# Patient Record
Sex: FEMALE | Race: WHITE | Employment: OTHER | URBAN - METROPOLITAN AREA
[De-identification: names, ages, dates, MRNs, and addresses within clinical notes are randomized per-mention and may not be internally consistent; named-entity substitution may affect disease eponyms.]

---

## 2019-05-30 ENCOUNTER — APPOINTMENT (OUTPATIENT)
Dept: CT IMAGING | Age: 69
End: 2019-05-30
Payer: MEDICARE

## 2019-05-30 ENCOUNTER — HOSPITAL ENCOUNTER (EMERGENCY)
Age: 69
Discharge: ANOTHER HOSPITAL | End: 2019-05-30
Attending: EMERGENCY MEDICINE
Payer: MEDICARE

## 2019-05-30 VITALS
SYSTOLIC BLOOD PRESSURE: 124 MMHG | HEART RATE: 79 BPM | HEIGHT: 64 IN | OXYGEN SATURATION: 100 % | BODY MASS INDEX: 16.22 KG/M2 | WEIGHT: 95 LBS | RESPIRATION RATE: 18 BRPM | DIASTOLIC BLOOD PRESSURE: 74 MMHG | TEMPERATURE: 98.1 F

## 2019-05-30 DIAGNOSIS — S02.30XA CLOSED FRACTURE OF ZYGOMATICOMAXILLARY COMPLEX, INITIAL ENCOUNTER (HCC): Primary | ICD-10-CM

## 2019-05-30 DIAGNOSIS — S02.80XA CLOSED FRACTURE OF ZYGOMATICOMAXILLARY COMPLEX, INITIAL ENCOUNTER (HCC): Primary | ICD-10-CM

## 2019-05-30 DIAGNOSIS — S02.401A CLOSED FRACTURE OF ZYGOMATICOMAXILLARY COMPLEX, INITIAL ENCOUNTER (HCC): Primary | ICD-10-CM

## 2019-05-30 DIAGNOSIS — S02.402A CLOSED FRACTURE OF ZYGOMATICOMAXILLARY COMPLEX, INITIAL ENCOUNTER (HCC): Primary | ICD-10-CM

## 2019-05-30 PROCEDURE — 6370000000 HC RX 637 (ALT 250 FOR IP): Performed by: EMERGENCY MEDICINE

## 2019-05-30 PROCEDURE — 99284 EMERGENCY DEPT VISIT MOD MDM: CPT

## 2019-05-30 PROCEDURE — 70486 CT MAXILLOFACIAL W/O DYE: CPT

## 2019-05-30 PROCEDURE — 2500000003 HC RX 250 WO HCPCS: Performed by: EMERGENCY MEDICINE

## 2019-05-30 RX ORDER — LISINOPRIL 5 MG/1
TABLET ORAL
COMMUNITY
Start: 2019-05-29 | End: 2022-02-08 | Stop reason: SDUPTHER

## 2019-05-30 RX ORDER — ONDANSETRON 4 MG/1
4 TABLET, ORALLY DISINTEGRATING ORAL ONCE
Status: COMPLETED | OUTPATIENT
Start: 2019-05-30 | End: 2019-05-30

## 2019-05-30 RX ORDER — SIMVASTATIN 10 MG
TABLET ORAL
COMMUNITY
Start: 2019-05-29 | End: 2022-02-08 | Stop reason: SDUPTHER

## 2019-05-30 RX ORDER — LIDOCAINE HYDROCHLORIDE AND EPINEPHRINE 10; 10 MG/ML; UG/ML
20 INJECTION, SOLUTION INFILTRATION; PERINEURAL ONCE
Status: COMPLETED | OUTPATIENT
Start: 2019-05-30 | End: 2019-05-30

## 2019-05-30 RX ADMIN — ONDANSETRON 4 MG: 4 TABLET, ORALLY DISINTEGRATING ORAL at 02:10

## 2019-05-30 RX ADMIN — LIDOCAINE HYDROCHLORIDE,EPINEPHRINE BITARTRATE 20 ML: 10; .01 INJECTION, SOLUTION INFILTRATION; PERINEURAL at 02:45

## 2019-05-30 SDOH — HEALTH STABILITY: MENTAL HEALTH: HOW OFTEN DO YOU HAVE A DRINK CONTAINING ALCOHOL?: NEVER

## 2019-05-30 ASSESSMENT — PAIN SCALES - GENERAL: PAINLEVEL_OUTOF10: 2

## 2019-05-30 NOTE — ED NOTES
Pt refused to have bandage applied/pt holding a wet wash cloth on her laceration.      Chani Brooke RN  05/30/19 9414

## 2019-05-30 NOTE — ED PROVIDER NOTES
[address] [facility] RESIDENT NOTE       Date of evaluation: 5/30/2019    Chief Complaint     Facial Laceration (laceration to right upper lip/pt reports she tripped and hit her computer desk)      History of Present Illness     Marcie Vargas is a 76 y.o. female who presents to the ED after a mechanical ground level fall that occurred just prior to arrival in the ED. She tripped while walking through her bedroom and fell into the corner of her wooden desk, contacting directly with her right orbit/maxilla. She denies loss of consciousness, nausea or vomiting, head pain, neck or back pain, chest pain or dyspnea, abdominal pain, and pain in her extremities. She also suffered lacerations to the right nasolabial fold and the right buccal mucosa. Denies malocclusion. Review of Systems     Review of Systems    10-point review of systems completed and negative other than those items listed in the HPI above. Past Medical, Surgical, Family, and Social History     She has a past medical history of Hyperlipidemia and Hypertension. She has no past surgical history on file. Her family history is not on file. She reports that she has been smoking cigarettes. She has been smoking about 1.00 pack per day. She has never used smokeless tobacco. She reports that she does not drink alcohol or use drugs. Medications     Previous Medications    LISINOPRIL (PRINIVIL;ZESTRIL) 5 MG TABLET    TAKE 1 TABLET BY MOUTH EVERY DAY    SIMVASTATIN (ZOCOR) 10 MG TABLET    TAKE 1 TABLET BY MOUTH EVERY DAY IN THE EVENING       Allergies     She has No Known Allergies. Physical Exam     INITIAL VITALS: BP: (!) 121/109, Temp: 98.1 °F (36.7 °C), Pulse: 78, Resp: 18, SpO2: 100 %   Physical Exam    General: Well developed and well nourished. No acute distress. HEENT: Right periorbital and maxillary ecchymosis with tenderness to palpation all along the zygomatic arch and maxilla.   No midface instability. Dental appliances in place with no fractures or malocclusion. No signs of entrapment. 3 cm partial thickness laceration along the right nasolabial fold, the inferior portion of which crosses the vermilion border. There is an additional small laceration to the right buccal mucosa that does not communicate with the external laceration. PERRL, moist mucous membranes. Neck: Trachea midline, neck supple with FROM and no midline tenderness, step-offs, or deformities; no JVD, no LAD  Heart: Regular rate and rhythm. No murmurs, gallops, or rubs appreciated. Lungs: Clear to auscultation in all fields bilaterally. Normal effort. Abd: Nondistended, no signs of trauma. Bowel sounds present. No tenderness to palpation, guarding, or rebound. MSK: No obvious deformities. Range of motion grossly intact. No thoracic or lumbar spine tenderness or deformity. No chest wall tenderness or deformity. Pelvis is stable to AP compression. No pain with bilateral log roll. All 4 extremities range through full spectrum of motion with no pain. Extremities: No cyanosis or edema. Peripheral pulses intact. Skin: No rashes, abrasions, contusions, or lacerations noted (other than the facial laceration mentioned above). Neuro: Alert and oriented, moves all extremities spontaneously. No gross motor or sensory deficits. Psych: Mood and affect appropriate. Thought process and content normal.    DiagnosticResults     EKG   Interpreted in conjunction with emergencydepartment physician Lionel Taylor MD    None    RADIOLOGY:  CT FACIAL BONES WO CONTRAST   Final Result     Acute right zygomaticomaxillary complex fracture is seen with fractures    of the anterior and posterior walls of the right maxillary sinus. There    is significant bony displacement of the inferior aspect of the maxillary    sinus fractures. LABS:   No results found for this visit on 05/30/19.     ED BEDSIDE ULTRASOUND:  None    RECENT VITALS: BP: (!) 121/109, Temp: 98.1 °F (36.7 °C), Pulse: 78,Resp: 18, SpO2: 100 %     Procedures     None    ED Course     Nursing Notes, Past Medical Hx, Past Surgical Hx, Social Hx, Allergies, and Family Hx were reviewed. The patient was given the followingmedications:  Orders Placed This Encounter   Medications    ondansetron (ZOFRAN-ODT) disintegrating tablet 4 mg    lidocaine-EPINEPHrine 1 percent-[id number] injection 20 mL       CONSULTS:  None    MEDICAL DECISION MAKING / ASSESSMENT / Christina Sharpe is a 76 y.o. female who presented to the emergency department with facial pain and laceration after a mechanical ground-level fall. On presentation she was hemodynamically stable and in no acute distress. GCS was 15, primary survey was intact, secondary survey was notable for laceration to the right nasolabial fold as well as to the right buccal mucosa. These lacerations were unrelated and noncommunicating. She also had right periorbital ecchymosis and tenderness along her zygomatic arch and maxilla. There were no clinical signs of entrapment. Remainder of her trauma physical exam showed no other concerning findings. Further CT imaging of head or cervical spine was not indicated. She declined my offer of pain or nausea medications. She stated that her tetanus booster was up to date. Prior to laceration repair I obtained a maxillofacial CT which showed right zygomaticomaxillary complex fracture with significant displacement of the inferior aspect of the maxillary sinus. I spoke with Dr. Leana Cleary, who was the attending on call for plastic surgery covering for face and he agreed to have the plastic surgery team evaluate the patient in the ED at [facility] but also recommended that ophthalmology be consulted for additional recommendations. Laceration repair was thus deferred for evaluation by plastic surgery and wound covered with dressing.     I discussed our findings and plan with the patient and she agreed to proceed by private vehicle to [facility] for further evaluation by plastic surgery. She was transferred to the receiving facility by POV in stable condition. This patient was also evaluated by the attending physician. All care plans werediscussed and agreed upon. Clinical Impression     1.  Closed fracture of zygomaticomaxillary complex, initial encounter [facility])        Disposition     PATIENT REFERRED TO:  [address] [place] [address]  [phone]      proceed directly to [facility] ED to be seen by plastic surgery      DISCHARGE MEDICATIONS:  New Prescriptions    No medications on file       DISPOSITION  Transferred to MD Shadi  05/30/19 0426

## 2019-05-30 NOTE — ED NOTES
Pt transferred to [facility] ER. Pt taken in POV by family at bedside. Pt and family verbalized understanding that they were going to [facility].       Aniceto Hernandez RN  05/30/19 6218

## 2022-02-08 ENCOUNTER — HOSPITAL ENCOUNTER (EMERGENCY)
Age: 72
Discharge: HOME | End: 2022-02-08
Attending: EMERGENCY MEDICINE
Payer: MEDICARE

## 2022-02-08 ENCOUNTER — APPOINTMENT (OUTPATIENT)
Dept: CT IMAGING | Age: 72
End: 2022-02-08
Payer: MEDICARE

## 2022-02-08 VITALS
HEART RATE: 88 BPM | RESPIRATION RATE: 18 BRPM | TEMPERATURE: 98.5 F | WEIGHT: 100 LBS | OXYGEN SATURATION: 95 % | SYSTOLIC BLOOD PRESSURE: 148 MMHG | BODY MASS INDEX: 17.07 KG/M2 | HEIGHT: 64 IN | DIASTOLIC BLOOD PRESSURE: 96 MMHG
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
## 2022-02-08 DIAGNOSIS — S50.12XA CONTUSION OF LEFT ELBOW AND FOREARM, INITIAL ENCOUNTER: ICD-10-CM

## 2022-02-08 DIAGNOSIS — S00.83XA FACIAL CONTUSION, INITIAL ENCOUNTER: Primary | ICD-10-CM

## 2022-02-08 PROCEDURE — 70450 CT HEAD/BRAIN W/O DYE: CPT

## 2022-02-08 PROCEDURE — 2500000003 HC RX 250 WO HCPCS: Performed by: PHYSICIAN ASSISTANT

## 2022-02-08 PROCEDURE — 99284 EMERGENCY DEPT VISIT MOD MDM: CPT

## 2022-02-08 RX ORDER — LIDOCAINE/RACEPINEP/TETRACAINE 4-0.05-0.5
SOLUTION WITH PREFILLED APPLICATOR (ML) TOPICAL ONCE
Status: COMPLETED | OUTPATIENT
Start: 2022-02-08 | End: 2022-02-08

## 2022-02-08 RX ORDER — SIMVASTATIN 10 MG
TABLET ORAL
Qty: 30 TABLET | Refills: 0 | Status: SHIPPED | OUTPATIENT
Start: 2022-02-08

## 2022-02-08 RX ORDER — LISINOPRIL 5 MG/1
TABLET ORAL
Qty: 30 TABLET | Refills: 0 | Status: SHIPPED | OUTPATIENT
Start: 2022-02-08

## 2022-02-08 RX ORDER — AMOXICILLIN AND CLAVULANATE POTASSIUM 875; 125 MG/1; MG/1
1 TABLET, FILM COATED ORAL 2 TIMES DAILY
Qty: 20 TABLET | Refills: 0 | Status: SHIPPED | OUTPATIENT
Start: 2022-02-08 | End: 2022-02-08

## 2022-02-08 RX ADMIN — LIDOCAINE-EPINEPHRINE-TETRACAINE EXTERNAL SOLN 4-0.05-0.5% 3 ML: 4-0.05-0.5 SOLUTION at 23:16

## 2022-02-08 ASSESSMENT — PAIN DESCRIPTION - DESCRIPTORS: DESCRIPTORS: SORE

## 2022-02-08 ASSESSMENT — PAIN DESCRIPTION - ORIENTATION: ORIENTATION: LEFT

## 2022-02-08 ASSESSMENT — PAIN DESCRIPTION - FREQUENCY: FREQUENCY: CONTINUOUS

## 2022-02-08 ASSESSMENT — PAIN DESCRIPTION - LOCATION: LOCATION: FACE

## 2022-02-08 ASSESSMENT — PAIN DESCRIPTION - PAIN TYPE: TYPE: ACUTE PAIN

## 2022-02-08 ASSESSMENT — PAIN SCALES - GENERAL: PAINLEVEL_OUTOF10: 4

## 2022-02-09 ASSESSMENT — ENCOUNTER SYMPTOMS
WHEEZING: 0
CHEST TIGHTNESS: 0
ABDOMINAL PAIN: 0
EYE REDNESS: 0
SORE THROAT: 0
BACK PAIN: 0
COLOR CHANGE: 0
DIARRHEA: 0
ABDOMINAL DISTENTION: 0
EYE PAIN: 0
VOMITING: 0
SINUS PRESSURE: 0
SHORTNESS OF BREATH: 0
RHINORRHEA: 0
NAUSEA: 0
COUGH: 0
EYE ITCHING: 0

## 2022-02-09 NOTE — ED PROVIDER NOTES
[facility]  EMERGENCY DEPARTMENT ENCOUNTER          PHYSICIAN ASSISTANT NOTE       Date of evaluation: 2/8/2022    Chief Complaint     Fall      History of Present Illness     Ashleigh Mosqueda is a 70 y.o. female with a past medical history as noted below who presents to the Emergency Department with a complaint of injuries from a fall. The patient reports that she had walked up to her driveway and was between the 2 cars in her driveway, when she slipped on ice, sustaining a fall, landing on the left side of her face and body. She denies any loss of consciousness. She reports that she landed on her left elbow and struck the left side of her face against the ground. She notes that she has a bruise to the left elbow, but reports no bony tenderness, joint dysfunction or significant pain in the region. She also notes that she has an increasing and worsening bruise over her left cheek. The patient notes that she bruises easily, though she is not on any anticoagulant or antiplatelet therapy. No lightheadedness, dizziness, chest pain, shortness of breath or other concerning prodromal symptoms prior to the fall. No nausea or vomiting, visual acuity changes, amnesia to the event or altered mentation since the time of the fall. Her tetanus immunization is reported to be up-to-date. Review of Systems     Review of Systems   Constitutional: Negative for chills, diaphoresis, fever and unexpected weight change. HENT: Negative for congestion, drooling, mouth sores, postnasal drip, rhinorrhea, sinus pressure and sore throat. Eyes: Negative for pain, redness and itching. Respiratory: Negative for cough, chest tightness, shortness of breath and wheezing. Cardiovascular: Negative for chest pain, palpitations and leg swelling. Gastrointestinal: Negative for abdominal distention, abdominal pain, diarrhea, nausea and vomiting. Genitourinary: Negative for dysuria and hematuria.    Musculoskeletal: Negative for arthralgias, back pain, gait problem, myalgias, neck pain and neck stiffness. Skin: Positive for wound. Negative for color change, pallor and rash. Neurological: Negative for dizziness, seizures, syncope, weakness, light-headedness, numbness and headaches. Hematological: Does not bruise/bleed easily. Psychiatric/Behavioral: Negative for agitation, hallucinations, self-injury, sleep disturbance and suicidal ideas. The patient is not nervous/anxious. All other systems reviewed and are negative. Physical Exam     INITIAL VITALS: BP: (!) 178/111, Temp: 98.5 °F (36.9 °C), Pulse: 85, Resp: 18, SpO2: 95 %     Nursing note and vitals reviewed. Physical Exam  Vitals and nursing note reviewed. Constitutional:       General: She is not in acute distress. Appearance: She is well-developed. She is not diaphoretic. HENT:      Head: Normocephalic. Abrasion and contusion present. Jaw: No tenderness or pain on movement. Comments: Large contusion with ecchymosis and abrasion overlying the patient's left zygoma. No tenderness to palpation of the orbital rim or surrounding facial bones. Right Ear: Tympanic membrane and ear canal normal.      Left Ear: Tympanic membrane and ear canal normal.      Nose:      Right Nostril: No epistaxis. Left Nostril: No epistaxis. Mouth/Throat:      Mouth: No injury. Eyes:      General: No visual field deficit or scleral icterus. Extraocular Movements: Extraocular movements intact. Conjunctiva/sclera: Conjunctivae normal.      Pupils: Pupils are equal, round, and reactive to light. Neck:      Vascular: No JVD. Cardiovascular:      Rate and Rhythm: Normal rate and regular rhythm. Heart sounds: Normal heart sounds. Pulmonary:      Effort: Pulmonary effort is normal. No respiratory distress. Breath sounds: Normal breath sounds. No stridor. No wheezing or rales. Chest:      Chest wall: No swelling or tenderness. Abdominal:      General: There is no distension. Palpations: Abdomen is soft. Tenderness: There is no abdominal tenderness. There is no guarding or rebound. Musculoskeletal:         General: Normal range of motion. Right elbow: Normal.      Left elbow: Swelling (Small, developing area of mild swelling and ecchymosis to the lateral aspect of the left forearm proximal to the olecranon process. No pain with active or passive range of motion or bony tenderness.) present. Cervical back: Normal range of motion and neck supple. Skin:     General: Skin is warm and dry. Coloration: Skin is not pale. Findings: No rash. Neurological:      Mental Status: She is alert and oriented to person, place, and time. GCS: GCS eye subscore is 4. GCS verbal subscore is 5. GCS motor subscore is 6. Cranial Nerves: No cranial nerve deficit, dysarthria or facial asymmetry. Sensory: No sensory deficit. Motor: No weakness, abnormal muscle tone or seizure activity. Coordination: Romberg sign negative. Gait: Gait normal.   Psychiatric:         Behavior: Behavior normal.        Procedures     N/A    MEDICAL DECISION MAKING     Bertin Dixon is admitted to the Emergency Department for evaluation of her chief complaint as described in the history of present illness. Complete history and physical was performed by me and my attending. Nursing notes, past medical history, surgical history, family history and social history were reviewed and addressed in the HPI. Lio Manjarrez is a 70 y.o. female who presents to the emergency department with a complaint of injuries from a fall. The patient sustained a mechanical fall on ice in the driveway of her home.   She struck the left side of her face and left elbow during the fall, but is not amnestic to the event, did not experience loss of consciousness and has not experienced any nausea, vomiting, visual acuity changes, altered mentation or other head injury related symptoms since the time of the fall. On arrival to the emergency department, the patient is conscious awake and oriented with noted hypertension. The patient reports that she is normally on antihypertensive medication and cholesterol medication, but has changed insurance companies and is in the process of changing primary care physicians, so she has not had her antihypertensive medication or cholesterol medication recently. A cursory, traumatic, head to toe secondary survey demonstrates contusion with abrasion and ecchymosis over the patient's left zygoma and a small contusion to the left lateral aspect of the bridge of the patient's nose which appears to have been caused by the nasal bridge pads of her glasses. Additionally, the patient demonstrates a small area of minimal swelling and ecchymosis of the lateral left forearm proximal to the olecranon process without bony tenderness, joint dysfunction or pain with range of motion. No other traumatic injuries were noted on evaluation. Given the patient's age, her propensity to bruise easily despite not being on any anticoagulants or antiplatelet medication, a CT scan of the head was performed which demonstrates no acute traumatic abnormality. Topical LET was applied to the abrasion/facial wound on the left side of the patient's face she was describing a burning sensation as a result of the abrasion. Ice therapy was also applied during her emergency department encounter. Repeat neurological evaluation prior to disposition demonstrates no change in the patient's mental status or concerning signs and symptoms for TBI/head injury. I feel the patient can be safely discharged home for continued self-care with strict return precautions for the development of TBI symptoms. The patient has requested a refill of her antihypertensive medication and cholesterol medications until she can establish with primary care.   I will provide the patient with referral to the [facility] [address] clinic and given the recent discontinuation of these medications, we will give her a single 30-day refill of both medications to reestablish and have these conditions followed at the [facility] medicine clinic. Wound care instructions for the facial abrasion/contusion were provided. I discussed this plan at length the patient who verbalizes understanding and is in agreement. The patient is currently stable and will be discharged home for continued self-care. Please see patient's AVS for additional discharge instructions. The patient was seen and evaluated by myself and the attending physician, KELLI Kiser MD, who agrees with my assessment, treatment and plan. Clinical Impression     1. Facial contusion, initial encounter    2. Contusion of left elbow and forearm, initial encounter    3. Fall due to slipping on ice or snow, initial encounter        Disposition     DISPOSITION Decision To Discharge 02/08/2022 10:52:36 PM      Marixa Jordan PA-C  5:17 AM    Relevant History and Diagnostic Information     Past Medical, Surgical, Family, and Social History     She has a past medical history of Hyperlipidemia and Hypertension. She has no past surgical history on file. Her family history is not on file. She reports that she has been smoking cigarettes. She has been smoking about 1.00 pack per day. She has never used smokeless tobacco. She reports that she does not drink alcohol and does not use drugs. Medications     Discharge Medication List as of 2/8/2022 11:22 PM          Allergies     She has No Known Allergies. ED Course     Nursing Notes, Past Medical Hx, Past Surgical Hx, Social Hx,Allergies, and Family Hx were reviewed.     Patient was given the following medications:  Orders Placed This Encounter   Medications    lidocaine-EPINEPHrine-tetracaine gel    DISCONTD: amoxicillin-clavulanate (AUGMENTIN) 875-125 MG per tablet     Sig: Take 1 tablet by mouth 2 times daily for 10 days     Dispense:  20 tablet     Refill:  0    lisinopril (PRINIVIL;ZESTRIL) 5 MG tablet     Sig: TAKE 1 TABLET BY MOUTH EVERY DAY     Dispense:  30 tablet     Refill:  0    simvastatin (ZOCOR) 10 MG tablet     Sig: TAKE 1 TABLET BY MOUTH EVERY DAY IN THE EVENING     Dispense:  30 tablet     Refill:  0       Diagnostic Results     RECENT VITALS:  BP: (!) 148/96,Temp: 98.5 °F (36.9 °C), Pulse: 88, Resp: 18, SpO2: 95 %     RADIOLOGY:  CT HEAD WO CONTRAST   Final Result   1. Mild age-appropriate atrophy. 2. Remote lacunar infarct is identified within the anterior limb of the left internal capsule. 3.  No evidence of an acute intracranial process. LABS:   No results found for this visit on 02/08/22.     CONSULTS:  None    PATIENT REFERRED TO:  [facility] [address] [id number]  [phone]    Schedule an appointment as soon as possible for a visit       [facility] Emergency Department  [address]  Go to   If symptoms worsen      DISCHARGE MEDICATIONS:  Discharge Medication List as of 2/8/2022 11:22 PM             [address] [place] [address]  02/09/22 0530

## 2022-10-24 ENCOUNTER — HOSPITAL ENCOUNTER (EMERGENCY)
Age: 72
Discharge: HOME | End: 2022-10-24
Attending: STUDENT IN AN ORGANIZED HEALTH CARE EDUCATION/TRAINING PROGRAM
Payer: MEDICARE

## 2022-10-24 ENCOUNTER — APPOINTMENT (OUTPATIENT)
Dept: GENERAL RADIOLOGY | Age: 72
End: 2022-10-24
Payer: MEDICARE

## 2022-10-24 VITALS
HEART RATE: 95 BPM | SYSTOLIC BLOOD PRESSURE: 143 MMHG | BODY MASS INDEX: 15.36 KG/M2 | DIASTOLIC BLOOD PRESSURE: 94 MMHG | RESPIRATION RATE: 16 BRPM | WEIGHT: 90 LBS | OXYGEN SATURATION: 96 % | HEIGHT: 64 IN | TEMPERATURE: 98.6 F

## 2022-10-24 DIAGNOSIS — S29.012A STRAIN OF MID-BACK, INITIAL ENCOUNTER: ICD-10-CM

## 2022-10-24 DIAGNOSIS — S39.012A STRAIN OF LUMBAR REGION, INITIAL ENCOUNTER: Primary | ICD-10-CM

## 2022-10-24 PROCEDURE — 6360000002 HC RX W HCPCS: Performed by: PHYSICIAN ASSISTANT

## 2022-10-24 PROCEDURE — 6370000000 HC RX 637 (ALT 250 FOR IP): Performed by: PHYSICIAN ASSISTANT

## 2022-10-24 PROCEDURE — 72100 X-RAY EXAM L-S SPINE 2/3 VWS: CPT

## 2022-10-24 PROCEDURE — 96374 THER/PROPH/DIAG INJ IV PUSH: CPT

## 2022-10-24 PROCEDURE — 71046 X-RAY EXAM CHEST 2 VIEWS: CPT

## 2022-10-24 PROCEDURE — 99284 EMERGENCY DEPT VISIT MOD MDM: CPT

## 2022-10-24 PROCEDURE — 72070 X-RAY EXAM THORAC SPINE 2VWS: CPT

## 2022-10-24 RX ORDER — METHOCARBAMOL 500 MG/1
1000 TABLET, FILM COATED ORAL 3 TIMES DAILY
Qty: 60 TABLET | Refills: 0 | Status: SHIPPED | OUTPATIENT
Start: 2022-10-24 | End: 2022-11-03

## 2022-10-24 RX ORDER — KETOROLAC TROMETHAMINE 30 MG/ML
15 INJECTION, SOLUTION INTRAMUSCULAR; INTRAVENOUS ONCE
Status: COMPLETED | OUTPATIENT
Start: 2022-10-24 | End: 2022-10-24

## 2022-10-24 RX ORDER — METHOCARBAMOL 500 MG/1
1000 TABLET, FILM COATED ORAL ONCE
Status: COMPLETED | OUTPATIENT
Start: 2022-10-24 | End: 2022-10-24

## 2022-10-24 RX ADMIN — METHOCARBAMOL TABLETS 1000 MG: 500 TABLET, COATED ORAL at 20:58

## 2022-10-24 RX ADMIN — KETOROLAC TROMETHAMINE 15 MG: 30 INJECTION, SOLUTION INTRAMUSCULAR at 20:58

## 2022-10-24 ASSESSMENT — PAIN SCALES - GENERAL
PAINLEVEL_OUTOF10: 10
PAINLEVEL_OUTOF10: 10

## 2022-10-24 ASSESSMENT — PAIN DESCRIPTION - ONSET: ONSET: ON-GOING

## 2022-10-24 ASSESSMENT — PAIN DESCRIPTION - FREQUENCY: FREQUENCY: CONTINUOUS

## 2022-10-24 ASSESSMENT — PAIN DESCRIPTION - PAIN TYPE: TYPE: ACUTE PAIN

## 2022-10-24 ASSESSMENT — PAIN - FUNCTIONAL ASSESSMENT: PAIN_FUNCTIONAL_ASSESSMENT: 0-10

## 2022-10-24 ASSESSMENT — PAIN DESCRIPTION - ORIENTATION: ORIENTATION: LOWER;LEFT

## 2022-10-24 ASSESSMENT — PAIN DESCRIPTION - LOCATION: LOCATION: BACK

## 2022-10-25 NOTE — ED PROVIDER NOTES
ED Attending Attestation Note     Date of evaluation: 10/24/2022    This patient was seen by the advance practice provider. I have seen and examined the patient, agree with the workup, evaluation, management and diagnosis. The care plan has been discussed. Patient, this is a patient who presents with left-sided back pain for the past 2 days after falling while trying to jump a fence. On my evaluation, she is able to ambulate and has no neurologic deficits in her lower extremities. She does have bony tenderness to palpation of the left mid to lower thoracic area without obvious deformity or crepitus. She has no midline bony spinal tenderness to palpation. She is not in any respiratory distress. The remainder of her physical examination is without tenderness or obvious visible injury.      Thais Ferris MD  10/24/22 0670

## 2022-10-25 NOTE — DISCHARGE INSTRUCTIONS
Thank you for choosing [facility] for your emergency care today. We take a lot of pride in the fact that you chose us for your care and we strived to do everything necessary to provide you with excellent medical care. We hope you agree that you received excellent care today. To continue that excellent medical care, the following information very important that you read and understand before you leave the emergency department today. It contains information about:  Your diagnosis  What was done for you in the emergency department  What you can expect from your illness or injury moving forward  The steps you will need to take after leaving the emergency department to help achieve the best possible outcome for your illness or injury. What we did in the Emergency Department Today:     You were seen in the Emergency Department today by Betzaida Ponce PA-C. You had the following lab abnormalities:  Labs Reviewed - No data to display    If no result appears for the test, then it was within normal limits. If the test is pending, the hospital will michelle you if the results are abnormal as soon as the test is completed. You had the following diagnostic imaging:  XR THORACIC SPINE (2 VIEWS)   Final Result   Impression:   No acute osseous injury. Mild thoracic spondylosis. Mild S-shaped scoliosis. XR CHEST (2 VW)   Final Result      No acute pulmonary disease. XR LUMBAR SPINE (2-3 VIEWS)   Final Result   Impression:   No acute osseous injury. Mild S-shaped scoliosis.           You were given the following medications during your stay in the Emergency Department:  Orders Placed This Encounter   Medications    methocarbamol (ROBAXIN) tablet 1,000 mg    ketorolac (TORADOL) injection 15 mg    methocarbamol (ROBAXIN) 500 MG tablet     Sig: Take 2 tablets by mouth 3 times daily for 10 days     Dispense:  60 tablet     Refill:  0       You were diagnosed with the followin. Strain of lumbar region, initial encounter    2. Strain of mid-back, initial encounter      IMPORTANT: If your condition worsens, or your development symptoms that you find concerning and want to have checked out immediately, do not hesitate to return to the Emergency Department. You can call  and have trained Emergency Medical Service providers bring you to the emergency department if you can't do so safely and quickly. They can begin the medical evaluation, on scene, wherever you are located and can begin critical medical care on the way to the emergency. Signs and symptoms that should always cause concern and be evaluated urgently or in the Emergency Department:  Going Unconscious  Dizziness, lightheadedness like you are going to faint, confusion, loss of balance or new clumsiness  New Seizures  Significant Head Injury  Eye Injuries or new vision changes  Severe Nausea and Vomiting that prevents you from eating, drinking and/or taking your medications  Significant or persistent fevers (Above 100.3 F in babies, over 80 F in adults, as an example)  Chest Pain  Shortness of Breath  Sudden, severe pain  Cuts that won't stop bleeding  Significant Carr  Bleeding during Pregnancy in women  Testicular Pain in men    Your Plan of Care after leaving our Emergency Department     You should read the following instructions before leaving the Emergency Department. If you have any questions about this Plan of Care, please don't hesitate to ask. It is important that you understand this Plan of Care so that you can achieve the best possible outcome from your illness or injury. IMPORTANT INSTRUCTIONS:    Pain Management Recommendations    Over-the-counter medications: There are many great over the counter medications that can be used to manage your pain. The following are some recommendations of things you can do to manage your short-term pain:    1. Tylenol (acetaminophen).  You may take 650-1000 mg (two regular or extra strength tablets) three times a day. DO NOT TAKE MORE THAN 3000 mg (Six extra-strength tablets) PER DAY. If you routinely consume more than three alcoholic beverages a day, do not take Tylenol before consulting your family physician. Other therapies:    Ice:  Apply ice or a frozen object, such as a bag of corn, etc from the freezer, to the injury. The cold will reduce swelling and pain at the injured site. This step should be done as soon as possible. Apply the frozen object to the area for 20 minutes 5-6 times a day for the first 48 hours. Do not use heat in the first 48 hours after an injury       Lidocaine Patches for Pain  I recommend the use of Lidocaine Pain patches to help with your pain. While these patches are available in prescription strength (5%) they can be rather expensive and often insurance companies may not cover their cost.    There is an over-the-counter form of these patches which have nearly the same concentration of the pain relieving medication Lidocaine (4%) and are typically much cheaper than their prescribed counterparts. Ask your pharmacist which would be a cheaper option for you. These patches can be obtained at most [facility] pharmacies ([employer], [employer], etc.) for $10-15 for 5-6 patches. Examples of these patches are shown to the left. As the pharmacist for assistance in locating them. Directions: Apply one patch over the region of pain and leave in place for up to 12 hours. Remove the patch and leave off for 12 hours before applying a new patch. Do this for the next 5 days or unless instructed otherwise by your providers. You should follow-up with:  No follow-up provider specified. You should call the number of the providers listed above to schedule follow-up appointments in the timeline recommended or to speak to a healthcare provider.  These providers also have on-call clinicians available after hours and on weekends for urgent questions and can be reached by calling the same number. If you feel your issue is an emergency, please don't hesitate to return to the emergency department for evaluation. If you can not safely and quickly get yourself to the emergency department, call 9-1-1 and have trained Emergency Medical Service providers bring you to the emergency department. They can begin the medical evaluation, on scene, wherever you are located and can begin critical medical care on the way to the emergency department while in the ambulance. Even if not listed above, you should always call your Primary Care Provider after a visit to the Emergency Department. They will want to know what your emergency was so they can best figure out how to continue to coordinate your care moving forward. Your Primary Care Provider is responsible for coordinating and tracking your health and medical care. You should keep them informed regularly of any and all new medical conditions, changes to your medications or other information pertinent to your health. DISCHARGE MEDICATIONS:  The following medications were prescribed for the you during this visit. Take them as directed below:     New Prescriptions    METHOCARBAMOL (ROBAXIN) 500 MG TABLET    Take 2 tablets by mouth 3 times daily for 10 days       These home medications were modified or discontinued during this visit (be sure you discuss these modifications with your primary care or prescribing physician as soon as possible):    Modified Medications    No medications on file     Discontinued Medications    No medications on file        You should continue to take the following home medications as prescribed by your physician:   Previous Medications    LISINOPRIL (PRINIVIL;ZESTRIL) 5 MG TABLET    TAKE 1 TABLET BY MOUTH EVERY DAY    SIMVASTATIN (ZOCOR) 10 MG TABLET    TAKE 1 TABLET BY MOUTH EVERY DAY IN THE EVENING       Additional important information has been included within this packet, please make sure that you have read this information as it will better help you understand your illness/injury better, the danger signs to watch for and things you can do to improve your condition and health in the future.

## 2022-11-07 ASSESSMENT — ENCOUNTER SYMPTOMS
CHEST TIGHTNESS: 0
SINUS PRESSURE: 0
SORE THROAT: 0
EYE ITCHING: 0
VOMITING: 0
SHORTNESS OF BREATH: 0
RHINORRHEA: 0
COLOR CHANGE: 0
COUGH: 0
WHEEZING: 0
EYE PAIN: 0
EYE REDNESS: 0
NAUSEA: 0
ABDOMINAL DISTENTION: 0
DIARRHEA: 0
BACK PAIN: 1
ABDOMINAL PAIN: 0

## 2022-11-07 NOTE — ED PROVIDER NOTES
[address] [facility] ENCOUNTER          PHYSICIAN ASSISTANT NOTE       Date of evaluation: 10/24/2022    Chief Complaint     Back Pain (Pt arrived via triage c/o lower left back pain x 2 days. Pt states she jumped a fence on Saturday and fell backwards onto the ground.)      History of Present Illness     Kristian Simon is a 67 y.o. female with a past medical history as noted below who presents to the Emergency Department with a complaint of left lower back pain. The patient reports that she accidentally got locked out of the parking where she had parked her car, and chose to try and jump the fence to access her vehicle. She reports she made it part of the way over the fence, but as she tried to pull her right leg over, she lost her balance and fell approximately 4 feet, landing on her left side. The fall occurred two days ago. Since then, she has had left lower back pain that she rates as a 10/10 on the pain scale. She reports the muscle feels \"tight. \" No midline pain. No bowel or bladder dysfunction. No gait abnormality. No saddle or distal paresthesias. She hasn't taken anything for her pain, because she \"doesn't like to take medications. \"     Review of Systems     Review of Systems   Constitutional:  Negative for chills, diaphoresis, fever and unexpected weight change. HENT:  Negative for congestion, drooling, mouth sores, postnasal drip, rhinorrhea, sinus pressure and sore throat. Eyes:  Negative for pain, redness and itching. Respiratory:  Negative for cough, chest tightness, shortness of breath and wheezing. Cardiovascular:  Negative for chest pain, palpitations and leg swelling. Gastrointestinal:  Negative for abdominal distention, abdominal pain, diarrhea, nausea and vomiting. Genitourinary:  Negative for dysuria and hematuria. Musculoskeletal:  Positive for back pain. Negative for arthralgias, gait problem, myalgias, neck pain and neck stiffness.    Skin:  Negative for color change, pallor and rash. Neurological:  Negative for dizziness, syncope, weakness, light-headedness, numbness and headaches. Hematological:  Does not bruise/bleed easily. Psychiatric/Behavioral:  Negative for agitation, hallucinations, self-injury, sleep disturbance and suicidal ideas. The patient is not nervous/anxious. All other systems reviewed and are negative. Physical Exam     INITIAL VITALS: BP: (!) 143/94, Temp: 98.6 °F (37 °C), Heart Rate: 95, Resp: 16, SpO2: 96 %     Nursing note and vitals reviewed. Physical Exam  Constitutional:       General: She is not in acute distress. Appearance: She is well-developed. HENT:      Head: Normocephalic and atraumatic. Eyes:      General: No scleral icterus. Conjunctiva/sclera: Conjunctivae normal.   Neck:      Vascular: No JVD. Cardiovascular:      Rate and Rhythm: Normal rate and regular rhythm. Heart sounds: Normal heart sounds. Pulmonary:      Effort: Pulmonary effort is normal. No respiratory distress. Breath sounds: Normal breath sounds. No wheezing or rales. Chest:      Chest wall: No tenderness. Abdominal:      General: There is no distension. Palpations: Abdomen is soft. Tenderness: There is no abdominal tenderness. Musculoskeletal:         General: Normal range of motion. Cervical back: Normal range of motion and neck supple. No signs of trauma, spasms, tenderness or bony tenderness. Thoracic back: No signs of trauma, spasms, tenderness or bony tenderness. Normal range of motion. Lumbar back: Spasms and tenderness present. No swelling, edema, deformity, signs of trauma or bony tenderness. Scoliosis (mild) present. Skin:     General: Skin is warm and dry. Coloration: Skin is not pale. Findings: No rash. Neurological:      Mental Status: She is alert and oriented to person, place, and time.    Psychiatric:         Behavior: Behavior normal.        Procedures N/A    MEDICAL DECISION MAKING     Odessa Oshea is admitted to the Emergency Department for evaluation of her chief complaint as described in the history of present illness. Complete history and physical was performed by me and my attending. Nursing notes, past medical history, surgical history, family history and social history were reviewed and addressed in the HPI. Rigoberto Lopez is a 67 y.o. female who presents to the emergency department with a complaint of left sided lower back pain. The patient tried to climb over a small fence, approximately 4 feet in height, and fell over. This occurred approximately 2 days ago. Experiencing left sided back pain and spasms. Hemodynamically stable and within normal limits on ED presentation. Left lumbosacral region with tenderness and spasm, but unable to differentiate midline spinous tenderness. X-rays performed of the chest, thoracic and lumbar spines which demonstrate no acute osseous or aerticular abnormalities. The patient has been seen and evaluated for their complaint of back pain. Patient denies recent trauma. The patient denies urinary incontinence, urinary retention, and numbness in the extremities. The patient is neurologically intact, sensation is intact in the lower extremities, dorsalis pedal pulses 2+ bilaterally, and achilles reflex intact bilaterally. It is my impression that based on history and negative physical exam findings the patient is not at risk for Cauda Equina Syndrome, or other spinal injury. At this point, the patient's nontraumatic back pain is likely musculoskeletal in nature. The patient will be instructed to continue taking anti-inflammatory medication, and given a Rx for short course of muscle relaxers and pain medication to include methocarbamol and ibuprofen. The patient will be given back stretching exercises, and instructed to follow up with their PCP or community clinic for further evaluation.   The patient should return to the ED if the back pain worsens, or if they experience incontinence, numbness or tingling in the legs, or inability to ambulate. The patient is in agreement with this plan. I discussed this plan at length the patient who verbalizes understanding and is in agreement. The patient is currently stable and will be discharged home for continued self-care. Please see patient's AVS for additional discharge instructions. The patient was seen initially by the physician assistant student on rotation. Although their history and physical examination of the patient was discussed, I personally performed a history and physical examination of the patient. The student made recommendations based on their findings for the medical decision making process for this patient, however all treatment/management plan processes were based on my verification of the information, examination and medical decision making. The patient's case was also discussed with the attending physician, Curtis Lamb MD, who agrees with my assessment, plan and medical decision making. Clinical Impression     1. Strain of lumbar region, initial encounter    2. Strain of mid-back, initial encounter        Disposition     DISPOSITION Decision To Discharge 10/24/2022 09:56:34 PM        Enrrique Crawley. SABA Jordan  5:13 AM    Relevant History and Diagnostic Information     Past Medical, Surgical, Family, and Social History     She has a past medical history of Hyperlipidemia and Hypertension. She has no past surgical history on file. Her family history is not on file. She reports that she has been smoking cigarettes. She has been smoking an average of 1 pack per day. She has never used smokeless tobacco. She reports that she does not drink alcohol and does not use drugs.     Medications     Discharge Medication List as of 10/24/2022 10:10 PM        CONTINUE these medications which have NOT CHANGED    Details   lisinopril (PRINIVIL;ZESTRIL) 5 MG tablet TAKE 1 TABLET BY MOUTH EVERY DAY, Disp-30 tablet, R-0Print      simvastatin (ZOCOR) 10 MG tablet TAKE 1 TABLET BY MOUTH EVERY DAY IN THE EVENING, Disp-30 tablet, R-0Print             Allergies     She has No Known Allergies. ED Course     Nursing Notes, Past Medical Hx, Past Surgical Hx, Social Hx,Allergies, and Family Hx were reviewed. Patient was given the following medications:  Orders Placed This Encounter   Medications    methocarbamol (ROBAXIN) tablet 1,000 mg    ketorolac (TORADOL) injection 15 mg    methocarbamol (ROBAXIN) 500 MG tablet     Sig: Take 2 tablets by mouth 3 times daily for 10 days     Dispense:  60 tablet     Refill:  0       Diagnostic Results     RECENT VITALS:  BP: (!) 143/94,Temp: 98.6 °F (37 °C), Heart Rate: 95, Resp: 16, SpO2: 96 %     RADIOLOGY:  XR THORACIC SPINE (2 VIEWS)   Final Result   Impression:   No acute osseous injury. Mild thoracic spondylosis. Mild S-shaped scoliosis. XR CHEST (2 VW)   Final Result      No acute pulmonary disease. XR LUMBAR SPINE (2-3 VIEWS)   Final Result   Impression:   No acute osseous injury. Mild S-shaped scoliosis. LABS:   No results found for this visit on 10/24/22. CONSULTS:  None    PATIENT REFERRED TO:  Jadiel Mann MD  [address]  [phone]    In 1 week  if symptoms are not improving on the prescribed regimen.       DISCHARGE MEDICATIONS:  Discharge Medication List as of 10/24/2022 10:10 PM        START taking these medications    Details   methocarbamol (ROBAXIN) 500 MG tablet Take 2 tablets by mouth 3 times daily for 10 days, Disp-60 tablet, R-0Print                [address] [place] [address]  11/07/22 1927